# Patient Record
Sex: FEMALE | Race: WHITE | NOT HISPANIC OR LATINO
[De-identification: names, ages, dates, MRNs, and addresses within clinical notes are randomized per-mention and may not be internally consistent; named-entity substitution may affect disease eponyms.]

---

## 2018-10-22 ENCOUNTER — TRANSCRIPTION ENCOUNTER (OUTPATIENT)
Age: 48
End: 2018-10-22

## 2018-12-11 ENCOUNTER — NON-APPOINTMENT (OUTPATIENT)
Age: 48
End: 2018-12-11

## 2018-12-11 ENCOUNTER — APPOINTMENT (OUTPATIENT)
Dept: INTERNAL MEDICINE | Facility: CLINIC | Age: 48
End: 2018-12-11
Payer: COMMERCIAL

## 2018-12-11 VITALS
HEART RATE: 82 BPM | DIASTOLIC BLOOD PRESSURE: 84 MMHG | TEMPERATURE: 98.3 F | SYSTOLIC BLOOD PRESSURE: 126 MMHG | OXYGEN SATURATION: 98 % | HEIGHT: 64 IN | BODY MASS INDEX: 30.73 KG/M2 | WEIGHT: 180 LBS

## 2018-12-11 DIAGNOSIS — Z83.3 FAMILY HISTORY OF DIABETES MELLITUS: ICD-10-CM

## 2018-12-11 DIAGNOSIS — Z78.9 OTHER SPECIFIED HEALTH STATUS: ICD-10-CM

## 2018-12-11 DIAGNOSIS — Z86.018 PERSONAL HISTORY OF OTHER BENIGN NEOPLASM: ICD-10-CM

## 2018-12-11 DIAGNOSIS — Z84.89 FAMILY HISTORY OF OTHER SPECIFIED CONDITIONS: ICD-10-CM

## 2018-12-11 DIAGNOSIS — R09.89 OTHER SPECIFIED SYMPTOMS AND SIGNS INVOLVING THE CIRCULATORY AND RESPIRATORY SYSTEMS: ICD-10-CM

## 2018-12-11 DIAGNOSIS — Z80.0 FAMILY HISTORY OF MALIGNANT NEOPLASM OF DIGESTIVE ORGANS: ICD-10-CM

## 2018-12-11 DIAGNOSIS — Z80.1 FAMILY HISTORY OF MALIGNANT NEOPLASM OF TRACHEA, BRONCHUS AND LUNG: ICD-10-CM

## 2018-12-11 DIAGNOSIS — Z82.49 FAMILY HISTORY OF ISCHEMIC HEART DISEASE AND OTHER DISEASES OF THE CIRCULATORY SYSTEM: ICD-10-CM

## 2018-12-11 DIAGNOSIS — G45.9 TRANSIENT CEREBRAL ISCHEMIC ATTACK, UNSPECIFIED: ICD-10-CM

## 2018-12-11 PROCEDURE — 93000 ELECTROCARDIOGRAM COMPLETE: CPT

## 2018-12-11 PROCEDURE — 99212 OFFICE O/P EST SF 10 MIN: CPT | Mod: 25

## 2018-12-11 PROCEDURE — 99386 PREV VISIT NEW AGE 40-64: CPT | Mod: 25

## 2018-12-11 PROCEDURE — 36415 COLL VENOUS BLD VENIPUNCTURE: CPT

## 2018-12-11 RX ORDER — ASPIRIN ENTERIC COATED TABLETS 81 MG 81 MG/1
81 TABLET, DELAYED RELEASE ORAL DAILY
Qty: 30 | Refills: 0 | Status: ACTIVE | COMMUNITY
Start: 2018-12-11

## 2018-12-11 NOTE — HEALTH RISK ASSESSMENT
[No falls in past year] : Patient reported no falls in the past year [0] : 2) Feeling down, depressed, or hopeless: Not at all (0) [Patient reported mammogram was normal] : Patient reported mammogram was normal [HIV Test offered] : HIV Test offered [With Family] : lives with family [Employed] : employed [] :  [Fully functional (bathing, dressing, toileting, transferring, walking, feeding)] : Fully functional (bathing, dressing, toileting, transferring, walking, feeding) [Fully functional (using the telephone, shopping, preparing meals, housekeeping, doing laundry, using] : Fully functional and needs no help or supervision to perform IADLs (using the telephone, shopping, preparing meals, housekeeping, doing laundry, using transportation, managing medications and managing finances) [ZNL7Eqsys] : 0 [Change in mental status noted] : No change in mental status noted [MammogramDate] : 01/13 [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

## 2018-12-11 NOTE — PHYSICAL EXAM
[No Acute Distress] : no acute distress [Well Nourished] : well nourished [Well Developed] : well developed [Well-Appearing] : well-appearing [Normal Voice/Communication] : normal voice/communication [Normal Sclera/Conjunctiva] : normal sclera/conjunctiva [PERRL] : pupils equal round and reactive to light [EOMI] : extraocular movements intact [Normal Outer Ear/Nose] : the outer ears and nose were normal in appearance [Normal Oropharynx] : the oropharynx was normal [Normal TMs] : both tympanic membranes were normal [Normal Nasal Mucosa] : the nasal mucosa was normal [No JVD] : no jugular venous distention [Supple] : supple [No Lymphadenopathy] : no lymphadenopathy [Thyroid Normal, No Nodules] : the thyroid was normal and there were no nodules present [No Respiratory Distress] : no respiratory distress  [Clear to Auscultation] : lungs were clear to auscultation bilaterally [No Accessory Muscle Use] : no accessory muscle use [Normal Rate] : normal rate  [Regular Rhythm] : with a regular rhythm [Normal S1, S2] : normal S1 and S2 [No Murmur] : no murmur heard [No Carotid Bruits] : no carotid bruits [No Abdominal Bruit] : a ~M bruit was not heard ~T in the abdomen [No Varicosities] : no varicosities [No Edema] : there was no peripheral edema [No Extremity Clubbing/Cyanosis] : no extremity clubbing/cyanosis [No Palpable Aorta] : no palpable aorta [Soft] : abdomen soft [Non Tender] : non-tender [Non-distended] : non-distended [No Masses] : no abdominal mass palpated [No HSM] : no HSM [Normal Bowel Sounds] : normal bowel sounds [No CVA Tenderness] : no CVA  tenderness [No Spinal Tenderness] : no spinal tenderness [No Joint Swelling] : no joint swelling [No Rash] : no rash [No Skin Lesions] : no skin lesions [No Focal Deficits] : no focal deficits [Normal Affect] : the affect was normal [Alert and Oriented x3] : oriented to person, place, and time [Normal Mood] : the mood was normal [Normal Insight/Judgement] : insight and judgment were intact [de-identified] : she has decreased pedal pulses DP and PT in left foot

## 2018-12-11 NOTE — ASSESSMENT
[FreeTextEntry1] : \par Left leg pain:\par -will check labs\par -will check left lower extremity venous doppler\par -given decreased pedal pulses will also check left leg arterial doppler\par \par Obesity:\par -Will check labs\par -I advised low fat/low cholesterol diet\par -BMI 30\par \par Hyperlipidemia:\par -she states she used to be on crestor but she ran out and never restarted\par -given hx of TIA in past she may benefit from restarting\par -will check fasting labs\par \par Hx of TIA:\par -she is on ASA 81mg PO daily\par -will check fasting labs-may benefit from statin\par \par HTN:\par -BP at goal on lisinopril 20mg PO daily.  (R/B/A/side effects discussed)\par -will check fasting labs\par -I advised low fat/low cholesterol diet, low salt diet, and weight loss\par \par Abnormal EKG/frequency PVC:\par -will check labs\par -I adv she cut down on caffeine intake\par -I have advised she see cardiology\par \par HCM:\par \par CPE 2018\par \par EKG 2018 NSR at 65 with multiple PVC\par \par Depression screenin2018 negative\par \par Flu shot: 2018\par \par Tdap: she states she has less than 10 years ago\par \par HIV testing offered: she consented to testing 2018\par \par GYN/PAP: referred today\par \par Mammogram: referred today\par \par Colonoscopy: she will be due at age 49-discussed today-will refer at next visit when she is closer to age 49\par \par F/U 3 months

## 2018-12-11 NOTE — HISTORY OF PRESENT ILLNESS
[FreeTextEntry1] : CPE [de-identified] : Here for CPE and to establish care\par \par She needs her BP medication refilled\par \par She states she has been getting pain in back of calf almost every night.  She states it feels like tightness or numbness.  She state she has had sx x 2 weeks.  She states there is no swelling.  She states she has been getting cramps in her left calf. She states this has not happened before.  No leg trauma reported.  No recent long plane or car rides.  No hx of blood clots\par \par She states she otherwise feels well today

## 2018-12-14 ENCOUNTER — APPOINTMENT (OUTPATIENT)
Dept: OBGYN | Facility: CLINIC | Age: 48
End: 2018-12-14
Payer: COMMERCIAL

## 2018-12-14 VITALS
SYSTOLIC BLOOD PRESSURE: 120 MMHG | WEIGHT: 180 LBS | HEIGHT: 64 IN | BODY MASS INDEX: 30.73 KG/M2 | DIASTOLIC BLOOD PRESSURE: 70 MMHG

## 2018-12-14 DIAGNOSIS — Z01.419 ENCOUNTER FOR GYNECOLOGICAL EXAMINATION (GENERAL) (ROUTINE) W/OUT ABNORMAL FINDINGS: ICD-10-CM

## 2018-12-14 PROCEDURE — 99386 PREV VISIT NEW AGE 40-64: CPT

## 2018-12-16 LAB
25(OH)D3 SERPL-MCNC: 15.7 NG/ML
ALBUMIN SERPL ELPH-MCNC: 3.9 G/DL
ALP BLD-CCNC: 53 U/L
ALT SERPL-CCNC: 11 U/L
ANION GAP SERPL CALC-SCNC: 11 MMOL/L
APPEARANCE: CLEAR
AST SERPL-CCNC: 17 U/L
BACTERIA: NEGATIVE
BASOPHILS # BLD AUTO: 0.04 K/UL
BASOPHILS NFR BLD AUTO: 0.9 %
BILIRUB SERPL-MCNC: 0.4 MG/DL
BILIRUBIN URINE: NEGATIVE
BLOOD URINE: NEGATIVE
BUN SERPL-MCNC: 12 MG/DL
CALCIUM SERPL-MCNC: 9.4 MG/DL
CHLORIDE SERPL-SCNC: 103 MMOL/L
CHOLEST SERPL-MCNC: 258 MG/DL
CHOLEST/HDLC SERPL: 6 RATIO
CK SERPL-CCNC: 167 U/L
CO2 SERPL-SCNC: 23 MMOL/L
COLOR: YELLOW
CREAT SERPL-MCNC: 0.67 MG/DL
EOSINOPHIL # BLD AUTO: 0.1 K/UL
EOSINOPHIL NFR BLD AUTO: 2.1 %
ERYTHROCYTE [SEDIMENTATION RATE] IN BLOOD BY WESTERGREN METHOD: 29 MM/HR
GLUCOSE QUALITATIVE U: NEGATIVE MG/DL
GLUCOSE SERPL-MCNC: 85 MG/DL
HBA1C MFR BLD HPLC: 4.9 %
HBV SURFACE AB SERPL IA-ACNC: <3 MIU/ML
HCT VFR BLD CALC: 34.8 %
HDLC SERPL-MCNC: 43 MG/DL
HGB BLD-MCNC: 10.2 G/DL
HIV1+2 AB SPEC QL IA.RAPID: NONREACTIVE
HYALINE CASTS: 1 /LPF
IMM GRANULOCYTES NFR BLD AUTO: 0 %
KETONES URINE: NEGATIVE
LDLC SERPL CALC-MCNC: 201 MG/DL
LEUKOCYTE ESTERASE URINE: NEGATIVE
LYMPHOCYTES # BLD AUTO: 2.29 K/UL
LYMPHOCYTES NFR BLD AUTO: 49.1 %
MAGNESIUM SERPL-MCNC: 2.1 MG/DL
MAN DIFF?: NORMAL
MCHC RBC-ENTMCNC: 22.4 PG
MCHC RBC-ENTMCNC: 29.3 GM/DL
MCV RBC AUTO: 76.3 FL
MICROSCOPIC-UA: NORMAL
MONOCYTES # BLD AUTO: 0.37 K/UL
MONOCYTES NFR BLD AUTO: 7.9 %
NEUTROPHILS # BLD AUTO: 1.86 K/UL
NEUTROPHILS NFR BLD AUTO: 40 %
NITRITE URINE: NEGATIVE
PH URINE: 6
PLATELET # BLD AUTO: 428 K/UL
POTASSIUM SERPL-SCNC: 4.5 MMOL/L
PROT SERPL-MCNC: 7.4 G/DL
PROTEIN URINE: NEGATIVE MG/DL
RBC # BLD: 4.56 M/UL
RBC # FLD: 16.4 %
RED BLOOD CELLS URINE: 1 /HPF
SODIUM SERPL-SCNC: 137 MMOL/L
SPECIFIC GRAVITY URINE: 1.02
SQUAMOUS EPITHELIAL CELLS: 6 /HPF
T4 FREE SERPL-MCNC: 1 NG/DL
TRIGL SERPL-MCNC: 71 MG/DL
TSH SERPL-ACNC: 0.86 UIU/ML
UROBILINOGEN URINE: NEGATIVE MG/DL
WBC # FLD AUTO: 4.66 K/UL
WHITE BLOOD CELLS URINE: 1 /HPF

## 2018-12-17 LAB — HPV HIGH+LOW RISK DNA PNL CVX: NOT DETECTED

## 2018-12-20 LAB — CYTOLOGY CVX/VAG DOC THIN PREP: NORMAL

## 2019-01-10 NOTE — REVIEW OF SYSTEMS
Initial (On Arrival) [Negative] : Psychiatric [Fever] : no fever [Chills] : no chills [Fatigue] : no fatigue [Recent Change In Weight] : ~T no recent weight change [Earache] : no earache [Nasal Discharge] : no nasal discharge [Sore Throat] : no sore throat [Chest Pain] : no chest pain [Palpitations] : no palpitations [Lower Ext Edema] : no lower extremity edema [Shortness Of Breath] : no shortness of breath [Wheezing] : no wheezing [Cough] : no cough [Dyspnea on Exertion] : no dyspnea on exertion [Abdominal Pain] : no abdominal pain [Nausea] : no nausea [Diarrhea] : diarrhea [Vomiting] : no vomiting [Anxiety] : no anxiety [Depression] : no depression [FreeTextEntry9] : see HPI

## 2019-02-08 ENCOUNTER — APPOINTMENT (OUTPATIENT)
Dept: INTERNAL MEDICINE | Facility: CLINIC | Age: 49
End: 2019-02-08
Payer: COMMERCIAL

## 2019-02-08 VITALS
RESPIRATION RATE: 14 BRPM | HEIGHT: 64 IN | BODY MASS INDEX: 30.73 KG/M2 | SYSTOLIC BLOOD PRESSURE: 124 MMHG | DIASTOLIC BLOOD PRESSURE: 88 MMHG | OXYGEN SATURATION: 95 % | HEART RATE: 77 BPM | WEIGHT: 180 LBS | TEMPERATURE: 98.7 F

## 2019-02-08 DIAGNOSIS — E55.9 VITAMIN D DEFICIENCY, UNSPECIFIED: ICD-10-CM

## 2019-02-08 DIAGNOSIS — M79.605 PAIN IN LEFT LEG: ICD-10-CM

## 2019-02-08 PROCEDURE — 36415 COLL VENOUS BLD VENIPUNCTURE: CPT

## 2019-02-08 PROCEDURE — 99214 OFFICE O/P EST MOD 30 MIN: CPT | Mod: 25

## 2019-02-08 RX ORDER — ERGOCALCIFEROL 1.25 MG/1
1.25 MG CAPSULE, LIQUID FILLED ORAL
Qty: 12 | Refills: 0 | Status: ACTIVE | COMMUNITY
Start: 2019-02-08 | End: 1900-01-01

## 2019-02-09 PROBLEM — M79.605 LEFT LEG PAIN: Status: ACTIVE | Noted: 2018-12-11

## 2019-02-09 NOTE — PHYSICAL EXAM
[No Acute Distress] : no acute distress [Well Nourished] : well nourished [Well Developed] : well developed [Well-Appearing] : well-appearing [Normal Voice/Communication] : normal voice/communication [Normal Sclera/Conjunctiva] : normal sclera/conjunctiva [PERRL] : pupils equal round and reactive to light [Normal Oropharynx] : the oropharynx was normal [No JVD] : no jugular venous distention [Supple] : supple [No Lymphadenopathy] : no lymphadenopathy [Thyroid Normal, No Nodules] : the thyroid was normal and there were no nodules present [No Respiratory Distress] : no respiratory distress  [Clear to Auscultation] : lungs were clear to auscultation bilaterally [No Accessory Muscle Use] : no accessory muscle use [Normal Rate] : normal rate  [Regular Rhythm] : with a regular rhythm [Normal S1, S2] : normal S1 and S2 [No Murmur] : no murmur heard [No Edema] : there was no peripheral edema [No Extremity Clubbing/Cyanosis] : no extremity clubbing/cyanosis [Soft] : abdomen soft [Non Tender] : non-tender [Non-distended] : non-distended [No Masses] : no abdominal mass palpated [No HSM] : no HSM [Normal Bowel Sounds] : normal bowel sounds [No Joint Swelling] : no joint swelling [No Rash] : no rash [No Skin Lesions] : no skin lesions [No Focal Deficits] : no focal deficits [Normal Affect] : the affect was normal [Alert and Oriented x3] : oriented to person, place, and time [Normal Mood] : the mood was normal [Normal Insight/Judgement] : insight and judgment were intact [No Spinal Tenderness] : no spinal tenderness [de-identified] : minimal left calf tenderness [de-identified] : left knee with no tenderness or swelling

## 2019-02-09 NOTE — ASSESSMENT
[FreeTextEntry1] : \par Left leg pain:\par -I again advised left lower extremity venous doppler to r/o DVT\par -given decreased pedal pulses I also advised eft leg arterial doppler\par \par Obesity:\par -I advised low fat/low cholesterol diet\par \par Hyperlipidemia:\par -recent labs reviewed with pt\par -will start crestor 10mg PO daily  (R/B/A/side effects discussed)\par -low fat/low cholesterol diet advised\par \par Hx of TIA:\par -she is on ASA 81mg PO daily\par -will start crestor\par \par HTN:\par -BP near goal on lisinopril 20mg PO daily.  ]\par -I advised low fat/low cholesterol diet, low salt diet, and weight loss\par \par Abnormal EKG/frequency PVC:\par -she denies chest pain, sob, palpitations\par -I have advised she see cardiology-she states she will make appt\par \par Anemia:\par -will check cbc and check iron/TIBC, ferritin, b12, folate\par \par Vitamin D Deficiency:\par -I advised ergocalciferol 50,000 units once a week for 12 weeks\par -After finishing 12 week course of ergocalciferol, I adv starting over the counter vitamin D 3 1000 units daily\par -I advised repeat vitamin D 25-OH level in 3 months\par \par \par \par HCM:\par \par CPE 2018\par \par EKG 2018\par \par Depression screenin2018 negative\par \par Flu shot: 2018\par \par Tdap: she states she has less than 10 years ago\par \par Hepatitis B vaccine: advised-she declined\par \par HIV testin2018 negative\par \par GYN/PAP: 2018\par \par Mammogram: referred-she states she has appt\par \par Colonoscopy: she will be due at age 49-discussed today-will refer at next visit when she is closer to age 49\par \par F/U 3 months

## 2019-02-09 NOTE — REVIEW OF SYSTEMS
[Negative] : Psychiatric [Fever] : no fever [Chills] : no chills [Fatigue] : no fatigue [Recent Change In Weight] : ~T no recent weight change [Chest Pain] : no chest pain [Palpitations] : no palpitations [Lower Ext Edema] : no lower extremity edema [Shortness Of Breath] : no shortness of breath [Wheezing] : no wheezing [Cough] : no cough [Dyspnea on Exertion] : no dyspnea on exertion [Abdominal Pain] : no abdominal pain [Nausea] : no nausea [Diarrhea] : diarrhea [Vomiting] : no vomiting [FreeTextEntry9] : see HPI

## 2019-02-09 NOTE — HISTORY OF PRESENT ILLNESS
[de-identified] : Here for follow up of her recent labs\par \par She states she is still having left leg pain.  She has not yet gotten left leg venous or arterial US yet.  No leg sweling or redness reported\par \par No new compalints

## 2019-02-10 ENCOUNTER — FORM ENCOUNTER (OUTPATIENT)
Age: 49
End: 2019-02-10

## 2019-02-11 ENCOUNTER — OUTPATIENT (OUTPATIENT)
Dept: OUTPATIENT SERVICES | Facility: HOSPITAL | Age: 49
LOS: 1 days | End: 2019-02-11
Payer: COMMERCIAL

## 2019-02-11 ENCOUNTER — APPOINTMENT (OUTPATIENT)
Dept: ULTRASOUND IMAGING | Facility: CLINIC | Age: 49
End: 2019-02-11
Payer: COMMERCIAL

## 2019-02-11 DIAGNOSIS — R09.89 OTHER SPECIFIED SYMPTOMS AND SIGNS INVOLVING THE CIRCULATORY AND RESPIRATORY SYSTEMS: ICD-10-CM

## 2019-02-11 DIAGNOSIS — M79.605 PAIN IN LEFT LEG: ICD-10-CM

## 2019-02-11 PROCEDURE — 93971 EXTREMITY STUDY: CPT | Mod: 26

## 2019-02-11 PROCEDURE — 93971 EXTREMITY STUDY: CPT

## 2019-02-11 PROCEDURE — 93926 LOWER EXTREMITY STUDY: CPT

## 2019-02-11 PROCEDURE — 93926 LOWER EXTREMITY STUDY: CPT | Mod: 26,LT

## 2019-02-12 ENCOUNTER — FORM ENCOUNTER (OUTPATIENT)
Age: 49
End: 2019-02-12

## 2019-02-13 ENCOUNTER — OUTPATIENT (OUTPATIENT)
Dept: OUTPATIENT SERVICES | Facility: HOSPITAL | Age: 49
LOS: 1 days | End: 2019-02-13
Payer: COMMERCIAL

## 2019-02-13 ENCOUNTER — APPOINTMENT (OUTPATIENT)
Dept: MAMMOGRAPHY | Facility: CLINIC | Age: 49
End: 2019-02-13
Payer: COMMERCIAL

## 2019-02-13 DIAGNOSIS — Z12.31 ENCOUNTER FOR SCREENING MAMMOGRAM FOR MALIGNANT NEOPLASM OF BREAST: ICD-10-CM

## 2019-02-13 PROCEDURE — 77067 SCR MAMMO BI INCL CAD: CPT

## 2019-02-13 PROCEDURE — 77067 SCR MAMMO BI INCL CAD: CPT | Mod: 26

## 2019-02-13 PROCEDURE — 77063 BREAST TOMOSYNTHESIS BI: CPT

## 2019-02-13 PROCEDURE — 77063 BREAST TOMOSYNTHESIS BI: CPT | Mod: 26

## 2019-02-16 LAB
ANION GAP SERPL CALC-SCNC: 12 MMOL/L
BASOPHILS # BLD AUTO: 0.09 K/UL
BASOPHILS NFR BLD AUTO: 2.4 %
BUN SERPL-MCNC: 11 MG/DL
CALCIUM SERPL-MCNC: 9.1 MG/DL
CHLORIDE SERPL-SCNC: 104 MMOL/L
CK SERPL-CCNC: 102 U/L
CO2 SERPL-SCNC: 22 MMOL/L
CREAT SERPL-MCNC: 0.69 MG/DL
EOSINOPHIL # BLD AUTO: 0.09 K/UL
EOSINOPHIL NFR BLD AUTO: 2.4 %
ERYTHROCYTE [SEDIMENTATION RATE] IN BLOOD BY WESTERGREN METHOD: 28 MM/HR
FERRITIN SERPL-MCNC: 6 NG/ML
FOLATE SERPL-MCNC: 11.3 NG/ML
GLUCOSE SERPL-MCNC: 110 MG/DL
HCT VFR BLD CALC: 33.4 %
HGB BLD-MCNC: 9.7 G/DL
IMM GRANULOCYTES NFR BLD AUTO: 0 %
IRON SATN MFR SERPL: 5 %
IRON SERPL-MCNC: 21 UG/DL
LYMPHOCYTES # BLD AUTO: 1.91 K/UL
LYMPHOCYTES NFR BLD AUTO: 51.6 %
MAN DIFF?: NORMAL
MCHC RBC-ENTMCNC: 20.7 PG
MCHC RBC-ENTMCNC: 29 GM/DL
MCV RBC AUTO: 71.4 FL
MONOCYTES # BLD AUTO: 0.17 K/UL
MONOCYTES NFR BLD AUTO: 4.6 %
NEUTROPHILS # BLD AUTO: 1.44 K/UL
NEUTROPHILS NFR BLD AUTO: 39 %
PLATELET # BLD AUTO: 390 K/UL
POTASSIUM SERPL-SCNC: 4 MMOL/L
RBC # BLD: 4.68 M/UL
RBC # FLD: 16.8 %
SODIUM SERPL-SCNC: 138 MMOL/L
TIBC SERPL-MCNC: 414 UG/DL
UIBC SERPL-MCNC: 393 UG/DL
VIT B12 SERPL-MCNC: 831 PG/ML
WBC # FLD AUTO: 3.7 K/UL

## 2019-02-24 ENCOUNTER — FORM ENCOUNTER (OUTPATIENT)
Age: 49
End: 2019-02-24

## 2019-02-25 ENCOUNTER — OUTPATIENT (OUTPATIENT)
Dept: OUTPATIENT SERVICES | Facility: HOSPITAL | Age: 49
LOS: 1 days | End: 2019-02-25
Payer: COMMERCIAL

## 2019-02-25 ENCOUNTER — APPOINTMENT (OUTPATIENT)
Dept: MAMMOGRAPHY | Facility: CLINIC | Age: 49
End: 2019-02-25
Payer: COMMERCIAL

## 2019-02-25 ENCOUNTER — APPOINTMENT (OUTPATIENT)
Dept: ULTRASOUND IMAGING | Facility: CLINIC | Age: 49
End: 2019-02-25
Payer: COMMERCIAL

## 2019-02-25 DIAGNOSIS — R92.8 OTHER ABNORMAL AND INCONCLUSIVE FINDINGS ON DIAGNOSTIC IMAGING OF BREAST: ICD-10-CM

## 2019-02-25 PROCEDURE — 76642 ULTRASOUND BREAST LIMITED: CPT | Mod: 26,LT

## 2019-02-25 PROCEDURE — 77065 DX MAMMO INCL CAD UNI: CPT | Mod: 26,LT

## 2019-02-25 PROCEDURE — G0279: CPT | Mod: 26

## 2019-02-25 PROCEDURE — 77065 DX MAMMO INCL CAD UNI: CPT

## 2019-02-25 PROCEDURE — G0279: CPT

## 2019-02-25 PROCEDURE — 76642 ULTRASOUND BREAST LIMITED: CPT

## 2019-03-11 ENCOUNTER — APPOINTMENT (OUTPATIENT)
Dept: INTERNAL MEDICINE | Facility: CLINIC | Age: 49
End: 2019-03-11
Payer: COMMERCIAL

## 2019-03-11 ENCOUNTER — LABORATORY RESULT (OUTPATIENT)
Age: 49
End: 2019-03-11

## 2019-03-11 VITALS
WEIGHT: 183 LBS | RESPIRATION RATE: 14 BRPM | BODY MASS INDEX: 31.24 KG/M2 | HEIGHT: 64 IN | OXYGEN SATURATION: 97 % | SYSTOLIC BLOOD PRESSURE: 130 MMHG | HEART RATE: 74 BPM | TEMPERATURE: 98.2 F | DIASTOLIC BLOOD PRESSURE: 80 MMHG

## 2019-03-11 DIAGNOSIS — H54.7 UNSPECIFIED VISUAL LOSS: ICD-10-CM

## 2019-03-11 DIAGNOSIS — D64.9 ANEMIA, UNSPECIFIED: ICD-10-CM

## 2019-03-11 DIAGNOSIS — Z00.00 ENCOUNTER FOR GENERAL ADULT MEDICAL EXAMINATION W/OUT ABNORMAL FINDINGS: ICD-10-CM

## 2019-03-11 DIAGNOSIS — Z12.11 ENCOUNTER FOR SCREENING FOR MALIGNANT NEOPLASM OF COLON: ICD-10-CM

## 2019-03-11 DIAGNOSIS — R92.8 OTHER ABNORMAL AND INCONCLUSIVE FINDINGS ON DIAGNOSTIC IMAGING OF BREAST: ICD-10-CM

## 2019-03-11 DIAGNOSIS — E78.5 HYPERLIPIDEMIA, UNSPECIFIED: ICD-10-CM

## 2019-03-11 DIAGNOSIS — D72.819 DECREASED WHITE BLOOD CELL COUNT, UNSPECIFIED: ICD-10-CM

## 2019-03-11 DIAGNOSIS — R20.0 ANESTHESIA OF SKIN: ICD-10-CM

## 2019-03-11 DIAGNOSIS — M54.5 LOW BACK PAIN: ICD-10-CM

## 2019-03-11 PROCEDURE — 99214 OFFICE O/P EST MOD 30 MIN: CPT | Mod: 25

## 2019-03-11 PROCEDURE — 36415 COLL VENOUS BLD VENIPUNCTURE: CPT

## 2019-03-11 RX ORDER — CHLORHEXIDINE GLUCONATE 4 %
325 (65 FE) LIQUID (ML) TOPICAL
Qty: 30 | Refills: 2 | Status: ACTIVE | COMMUNITY
Start: 2019-03-11 | End: 1900-01-01

## 2019-03-11 NOTE — PHYSICAL EXAM
[No Acute Distress] : no acute distress [Well Nourished] : well nourished [Well Developed] : well developed [Well-Appearing] : well-appearing [Normal Voice/Communication] : normal voice/communication [Normal Sclera/Conjunctiva] : normal sclera/conjunctiva [PERRL] : pupils equal round and reactive to light [Normal Oropharynx] : the oropharynx was normal [No JVD] : no jugular venous distention [Supple] : supple [No Lymphadenopathy] : no lymphadenopathy [Thyroid Normal, No Nodules] : the thyroid was normal and there were no nodules present [No Respiratory Distress] : no respiratory distress  [Clear to Auscultation] : lungs were clear to auscultation bilaterally [No Accessory Muscle Use] : no accessory muscle use [Normal Rate] : normal rate  [Regular Rhythm] : with a regular rhythm [Normal S1, S2] : normal S1 and S2 [No Murmur] : no murmur heard [No Edema] : there was no peripheral edema [No Extremity Clubbing/Cyanosis] : no extremity clubbing/cyanosis [Soft] : abdomen soft [Non Tender] : non-tender [Non-distended] : non-distended [No Masses] : no abdominal mass palpated [No HSM] : no HSM [Normal Bowel Sounds] : normal bowel sounds [No Spinal Tenderness] : no spinal tenderness [No Joint Swelling] : no joint swelling [No Rash] : no rash [No Skin Lesions] : no skin lesions [No Focal Deficits] : no focal deficits [Normal Affect] : the affect was normal [Alert and Oriented x3] : oriented to person, place, and time [Normal Mood] : the mood was normal [Normal Insight/Judgement] : insight and judgment were intact [No CVA Tenderness] : no CVA  tenderness [de-identified] : no calf tenderness [de-identified] : negative SLr B/L

## 2019-03-11 NOTE — ASSESSMENT
[FreeTextEntry1] : \par Left leg pain/numbness/low back pain:\par - left lower extremity venous doppler and left leg arterial doppler were negative\par -she may have herniated disc causing lumbar radiculopathy\par -will check MRI of L-spine\par -f/u 3 months\par \par Obesity:\par -I advised low fat/low cholesterol diet\par \par Hyperlipidemia:\par -on crestor 10mg PO daily \par -low fat/low cholesterol diet advised\par -will check fasting labs\par \par Hx of TIA:\par -she is on ASA 81mg PO daily and crestor\par \par HTN:\par -BP at goal isinopril 20mg PO daily.  \par -I advised low fat/low cholesterol diet, low salt diet, and weight loss\par \par Abnormal EKG/frequency PVC:\par -she denies chest pain, sob, palpitations\par -I have advised she see cardiology-she states she has appt\par \par Anemia:\par -+ iron deficiency anemia which is likely from menses but she is 49 now so she is due for colon cancer screening\par -will refer to GI for colonoscopy\par -will check FIT test\par -will start ferrous sulfate 325mg PO daily  (R/B/A/side effects discussed)\par \par Mild leukopenia\par -will repeat cbc\par \par Vitamin D Deficiency:\par -I advised ergocalciferol 50,000 units once a week for 12 weeks\par -After finishing 12 week course of ergocalciferol, I adv starting over the counter vitamin D 3 1000 units daily\par -I advised repeat vitamin D 25-OH level in 3 months\par \par Abnormal mammogram: she needs f/u left breast US in 6 months-approx 2019\par \par Worsening vision:\par -will refer to ophthalmology\par \par \par HCM:\par \par CPE 2018\par \par EKG 2018\par \par Depression screenin2018 negative\par \par Flu shot: 2018\par \par Tdap: she states she has less than 10 years ago\par \par Hepatitis B vaccine: advised-she declined\par \par HIV testin2018 negative\par \par GYN/PAP: 2018\par \par Mammogram: 2019\par \par Colonoscopy: advised and referred today\par \par FIT testing kit given to pt today\par \par F/U 3 months

## 2019-03-11 NOTE — HISTORY OF PRESENT ILLNESS
[de-identified] : Here for follow up of her left leg pain\par \par She states she is still having left lateral calf pain associated with numbness.  She states it radiates to her toes.  She reports occasional low back pain\par \par She states she would also like to see an ophthalmologist because she thinks she may need new glasses as she is having a hard time reading small print

## 2019-03-11 NOTE — REVIEW OF SYSTEMS
[Negative] : Psychiatric [Fever] : no fever [Chills] : no chills [Fatigue] : no fatigue [Recent Change In Weight] : ~T no recent weight change [Pain] : no pain [Redness] : no redness [Vision Problems] : vision problems [Chest Pain] : no chest pain [Palpitations] : no palpitations [Lower Ext Edema] : no lower extremity edema [Shortness Of Breath] : no shortness of breath [Wheezing] : no wheezing [Cough] : no cough [Dyspnea on Exertion] : no dyspnea on exertion [Abdominal Pain] : no abdominal pain [Nausea] : no nausea [Diarrhea] : diarrhea [Vomiting] : no vomiting [FreeTextEntry9] : see HPI

## 2019-03-19 ENCOUNTER — APPOINTMENT (OUTPATIENT)
Dept: CARDIOLOGY | Facility: CLINIC | Age: 49
End: 2019-03-19
Payer: COMMERCIAL

## 2019-03-19 ENCOUNTER — NON-APPOINTMENT (OUTPATIENT)
Age: 49
End: 2019-03-19

## 2019-03-19 ENCOUNTER — TRANSCRIPTION ENCOUNTER (OUTPATIENT)
Age: 49
End: 2019-03-19

## 2019-03-19 VITALS
HEIGHT: 64 IN | WEIGHT: 186 LBS | RESPIRATION RATE: 14 BRPM | SYSTOLIC BLOOD PRESSURE: 156 MMHG | OXYGEN SATURATION: 98 % | DIASTOLIC BLOOD PRESSURE: 96 MMHG | HEART RATE: 72 BPM | BODY MASS INDEX: 31.76 KG/M2

## 2019-03-19 DIAGNOSIS — I49.3 VENTRICULAR PREMATURE DEPOLARIZATION: ICD-10-CM

## 2019-03-19 DIAGNOSIS — I10 ESSENTIAL (PRIMARY) HYPERTENSION: ICD-10-CM

## 2019-03-19 DIAGNOSIS — Z86.39 PERSONAL HISTORY OF OTHER ENDOCRINE, NUTRITIONAL AND METABOLIC DISEASE: ICD-10-CM

## 2019-03-19 DIAGNOSIS — R94.31 ABNORMAL ELECTROCARDIOGRAM [ECG] [EKG]: ICD-10-CM

## 2019-03-19 DIAGNOSIS — Z82.49 FAMILY HISTORY OF ISCHEMIC HEART DISEASE AND OTHER DISEASES OF THE CIRCULATORY SYSTEM: ICD-10-CM

## 2019-03-19 DIAGNOSIS — R42 DIZZINESS AND GIDDINESS: ICD-10-CM

## 2019-03-19 DIAGNOSIS — E66.9 OBESITY, UNSPECIFIED: ICD-10-CM

## 2019-03-19 PROCEDURE — 99244 OFF/OP CNSLTJ NEW/EST MOD 40: CPT

## 2019-03-19 PROCEDURE — 93000 ELECTROCARDIOGRAM COMPLETE: CPT

## 2019-03-19 NOTE — HISTORY OF PRESENT ILLNESS
[FreeTextEntry1] : Patient is a 48yo F with HTN, HLD, obesity here for cardiac evaluation of an abnormal ECG.  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 Here for PVCs. Occasionally gets dizzy/lightheaded. No CP/SOB. Patient denies PND/orthopnea/edema/palpitations/syncope/claudication GEts pain in left calf and radiates down to toes. Feels tingling as well. Worse when sitting or laying down. NO regular exercise, walks a little at work. \par \par ROS: GI negative, all others negative

## 2019-03-19 NOTE — DISCUSSION/SUMMARY
[FreeTextEntry1] : Patient is a 50yo F with HTN, HLD, obesity here for cardiac evaluation of an abnormal ECG showing PVCs. Was having palps but now better she states since on meds for BP. Still with dizzy spells. Also with significant HLD and high LDL, much better on statin now. Needs evaluation for ischemic heart disease and carotid disease. May  need to add additional BP meds, advised diet/weight loss first. \par \par 1. Continue ASA/statin, diet/weight loss and recheck lipids with PMD\par 2. Diet and weight loss for HTN, may need to add meds but not at this time\par 3. Echo and EST to evaluate PVCs in setting of HLD/HTN/obesity\par 4. Carotid duplex to evaluate dizzy spells with h/o severely elevated LDL\par 5. Follow up after testing\par 6. Will discuss regular exercise program at follow up as long as testing unremarkable \par

## 2019-03-19 NOTE — ASSESSMENT
[FreeTextEntry1] : ECG: SR, minor NSST\par ECG: SR, PVCs (12/2018)\par \par  HDL 37  TG 80 (3/2019)\par  HDL 43  TG 71 (12/2018)

## 2019-03-23 LAB
ALBUMIN SERPL ELPH-MCNC: 3.6 G/DL
ALP BLD-CCNC: 52 U/L
ALT SERPL-CCNC: 11 U/L
ANION GAP SERPL CALC-SCNC: 12 MMOL/L
AST SERPL-CCNC: 13 U/L
BASOPHILS # BLD AUTO: 0.08 K/UL
BASOPHILS NFR BLD AUTO: 1.5 %
BILIRUB SERPL-MCNC: 0.3 MG/DL
BUN SERPL-MCNC: 14 MG/DL
CALCIUM SERPL-MCNC: 8.9 MG/DL
CHLORIDE SERPL-SCNC: 104 MMOL/L
CHOLEST SERPL-MCNC: 189 MG/DL
CHOLEST/HDLC SERPL: 5.1 RATIO
CO2 SERPL-SCNC: 24 MMOL/L
CREAT SERPL-MCNC: 0.64 MG/DL
EOSINOPHIL # BLD AUTO: 0.12 K/UL
EOSINOPHIL NFR BLD AUTO: 2.3 %
GLUCOSE SERPL-MCNC: 103 MG/DL
HCT VFR BLD CALC: 32.4 %
HDLC SERPL-MCNC: 37 MG/DL
HGB BLD-MCNC: 8.9 G/DL
IMM GRANULOCYTES NFR BLD AUTO: 0.2 %
LDLC SERPL CALC-MCNC: 136 MG/DL
LYMPHOCYTES # BLD AUTO: 2.51 K/UL
LYMPHOCYTES NFR BLD AUTO: 47.1 %
MAN DIFF?: NORMAL
MCHC RBC-ENTMCNC: 20.3 PG
MCHC RBC-ENTMCNC: 27.5 GM/DL
MCV RBC AUTO: 74 FL
MONOCYTES # BLD AUTO: 0.41 K/UL
MONOCYTES NFR BLD AUTO: 7.7 %
NEUTROPHILS # BLD AUTO: 2.2 K/UL
NEUTROPHILS NFR BLD AUTO: 41.2 %
PLATELET # BLD AUTO: 395 K/UL
POTASSIUM SERPL-SCNC: 4.1 MMOL/L
PROT SERPL-MCNC: 6.5 G/DL
RBC # BLD: 4.38 M/UL
RBC # FLD: 17.2 %
SODIUM SERPL-SCNC: 139 MMOL/L
TRIGL SERPL-MCNC: 80 MG/DL
WBC # FLD AUTO: 5.33 K/UL

## 2019-04-05 ENCOUNTER — APPOINTMENT (OUTPATIENT)
Dept: CARDIOLOGY | Facility: CLINIC | Age: 49
End: 2019-04-05
Payer: COMMERCIAL

## 2019-04-05 PROCEDURE — 93306 TTE W/DOPPLER COMPLETE: CPT

## 2019-04-05 PROCEDURE — 93880 EXTRACRANIAL BILAT STUDY: CPT

## 2019-04-17 ENCOUNTER — APPOINTMENT (OUTPATIENT)
Dept: CARDIOLOGY | Facility: CLINIC | Age: 49
End: 2019-04-17

## 2019-04-26 ENCOUNTER — FORM ENCOUNTER (OUTPATIENT)
Age: 49
End: 2019-04-26

## 2019-04-27 ENCOUNTER — APPOINTMENT (OUTPATIENT)
Dept: MRI IMAGING | Facility: CLINIC | Age: 49
End: 2019-04-27
Payer: COMMERCIAL

## 2019-04-27 ENCOUNTER — OUTPATIENT (OUTPATIENT)
Dept: OUTPATIENT SERVICES | Facility: HOSPITAL | Age: 49
LOS: 1 days | End: 2019-04-27
Payer: COMMERCIAL

## 2019-04-27 DIAGNOSIS — Z00.00 ENCOUNTER FOR GENERAL ADULT MEDICAL EXAMINATION WITHOUT ABNORMAL FINDINGS: ICD-10-CM

## 2019-04-27 DIAGNOSIS — M54.5 LOW BACK PAIN: ICD-10-CM

## 2019-04-27 PROCEDURE — 72148 MRI LUMBAR SPINE W/O DYE: CPT

## 2019-04-27 PROCEDURE — 72148 MRI LUMBAR SPINE W/O DYE: CPT | Mod: 26

## 2019-05-01 ENCOUNTER — RX RENEWAL (OUTPATIENT)
Age: 49
End: 2019-05-01

## 2019-05-06 ENCOUNTER — APPOINTMENT (OUTPATIENT)
Dept: OPHTHALMOLOGY | Facility: CLINIC | Age: 49
End: 2019-05-06
Payer: COMMERCIAL

## 2019-05-06 DIAGNOSIS — H25.13 AGE-RELATED NUCLEAR CATARACT, BILATERAL: ICD-10-CM

## 2019-05-06 PROCEDURE — 92004 COMPRE OPH EXAM NEW PT 1/>: CPT

## 2019-05-07 PROBLEM — H25.13 AGE-RELATED NUCLEAR CATARACT OF BOTH EYES: Status: ACTIVE | Noted: 2019-05-07

## 2019-05-13 ENCOUNTER — TRANSCRIPTION ENCOUNTER (OUTPATIENT)
Age: 49
End: 2019-05-13

## 2019-05-13 ENCOUNTER — RESULT REVIEW (OUTPATIENT)
Age: 49
End: 2019-05-13

## 2019-06-10 ENCOUNTER — APPOINTMENT (OUTPATIENT)
Dept: INTERNAL MEDICINE | Facility: CLINIC | Age: 49
End: 2019-06-10

## 2019-08-06 ENCOUNTER — RX RENEWAL (OUTPATIENT)
Age: 49
End: 2019-08-06

## 2019-08-06 RX ORDER — ROSUVASTATIN CALCIUM 10 MG/1
10 TABLET, FILM COATED ORAL DAILY
Qty: 90 | Refills: 0 | Status: ACTIVE | COMMUNITY
Start: 2019-02-08 | End: 1900-01-01

## 2019-08-26 ENCOUNTER — APPOINTMENT (OUTPATIENT)
Dept: ULTRASOUND IMAGING | Facility: CLINIC | Age: 49
End: 2019-08-26

## 2019-09-15 ENCOUNTER — RX RENEWAL (OUTPATIENT)
Age: 49
End: 2019-09-15

## 2019-09-15 RX ORDER — LISINOPRIL 20 MG/1
20 TABLET ORAL DAILY
Qty: 30 | Refills: 0 | Status: ACTIVE | COMMUNITY
Start: 2018-12-11 | End: 1900-01-01

## 2019-09-24 ENCOUNTER — TRANSCRIPTION ENCOUNTER (OUTPATIENT)
Age: 49
End: 2019-09-24

## 2021-08-22 ENCOUNTER — TRANSCRIPTION ENCOUNTER (OUTPATIENT)
Age: 51
End: 2021-08-22

## 2022-05-28 ENCOUNTER — NON-APPOINTMENT (OUTPATIENT)
Age: 52
End: 2022-05-28